# Patient Record
Sex: FEMALE | Race: AMERICAN INDIAN OR ALASKA NATIVE
[De-identification: names, ages, dates, MRNs, and addresses within clinical notes are randomized per-mention and may not be internally consistent; named-entity substitution may affect disease eponyms.]

---

## 2019-09-05 ENCOUNTER — HOSPITAL ENCOUNTER (EMERGENCY)
Dept: HOSPITAL 5 - ED | Age: 34
Discharge: HOME | End: 2019-09-05
Payer: COMMERCIAL

## 2019-09-05 VITALS — DIASTOLIC BLOOD PRESSURE: 94 MMHG | SYSTOLIC BLOOD PRESSURE: 126 MMHG

## 2019-09-05 DIAGNOSIS — N83.201: ICD-10-CM

## 2019-09-05 DIAGNOSIS — K29.70: Primary | ICD-10-CM

## 2019-09-05 DIAGNOSIS — Z79.899: ICD-10-CM

## 2019-09-05 LAB
ALBUMIN SERPL-MCNC: 3.9 G/DL (ref 3.9–5)
ALT SERPL-CCNC: 20 UNITS/L (ref 7–56)
BACTERIA #/AREA URNS HPF: (no result) /HPF
BASOPHILS # (AUTO): 0 K/MM3 (ref 0–0.1)
BASOPHILS NFR BLD AUTO: 0.5 % (ref 0–1.8)
BILIRUB UR QL STRIP: (no result)
BLOOD UR QL VISUAL: (no result)
BUN SERPL-MCNC: 5 MG/DL (ref 7–17)
BUN/CREAT SERPL: 8 %
CALCIUM SERPL-MCNC: 9.1 MG/DL (ref 8.4–10.2)
EOSINOPHIL # BLD AUTO: 0 K/MM3 (ref 0–0.4)
EOSINOPHIL NFR BLD AUTO: 0.5 % (ref 0–4.3)
HCT VFR BLD CALC: 37.5 % (ref 30.3–42.9)
HEMOLYSIS INDEX: 7
HGB BLD-MCNC: 12.7 GM/DL (ref 10.1–14.3)
LYMPHOCYTES # BLD AUTO: 1.7 K/MM3 (ref 1.2–5.4)
LYMPHOCYTES NFR BLD AUTO: 27.6 % (ref 13.4–35)
MCHC RBC AUTO-ENTMCNC: 34 % (ref 30–34)
MCV RBC AUTO: 88 FL (ref 79–97)
MONOCYTES # (AUTO): 0.5 K/MM3 (ref 0–0.8)
MONOCYTES % (AUTO): 8 % (ref 0–7.3)
PH UR STRIP: 7 [PH] (ref 5–7)
PLATELET # BLD: 272 K/MM3 (ref 140–440)
PROT UR STRIP-MCNC: (no result) MG/DL
RBC # BLD AUTO: 4.27 M/MM3 (ref 3.65–5.03)
RBC #/AREA URNS HPF: 2 /HPF (ref 0–6)
UROBILINOGEN UR-MCNC: < 2 MG/DL (ref ?–2)
WBC #/AREA URNS HPF: 3 /HPF (ref 0–6)

## 2019-09-05 PROCEDURE — 74177 CT ABD & PELVIS W/CONTRAST: CPT

## 2019-09-05 PROCEDURE — 85025 COMPLETE CBC W/AUTO DIFF WBC: CPT

## 2019-09-05 PROCEDURE — 96374 THER/PROPH/DIAG INJ IV PUSH: CPT

## 2019-09-05 PROCEDURE — 99284 EMERGENCY DEPT VISIT MOD MDM: CPT

## 2019-09-05 PROCEDURE — 36415 COLL VENOUS BLD VENIPUNCTURE: CPT

## 2019-09-05 PROCEDURE — 84703 CHORIONIC GONADOTROPIN ASSAY: CPT

## 2019-09-05 PROCEDURE — 81001 URINALYSIS AUTO W/SCOPE: CPT

## 2019-09-05 PROCEDURE — 96361 HYDRATE IV INFUSION ADD-ON: CPT

## 2019-09-05 PROCEDURE — 96375 TX/PRO/DX INJ NEW DRUG ADDON: CPT

## 2019-09-05 PROCEDURE — 80053 COMPREHEN METABOLIC PANEL: CPT

## 2019-09-05 NOTE — EMERGENCY DEPARTMENT REPORT
ED Abdominal Pain HPI





- General


Chief Complaint: Abdominal Pain


Stated Complaint: EXTREME ABD PAIN


Time Seen by Provider: 09/05/19 11:05


Source: patient


Mode of arrival: Ambulatory


Limitations: No Limitations





- History of Present Illness


Initial Comments: 





Patient is a 34-year-old  female who is presenting with some epigastric 

and central abdominal crampiness with nausea and vomiting for the past 2 days.  

Patient states she has a similar episode last month.  Patient's been able to 

keep very little down in the last 2 days secondary to the nausea and vomiting.  

She denies diarrhea fevers chills cough cold or congestion at this time.  

Patient's states that she did see her primary care physician last month and was 

told she likely had a gastroenteritis.  Patient went to emergency department as 

well was told she may have an infection but was not given any additional 

details.


Severity scale (0 -10): 2





- Related Data


                                  Previous Rx's











 Medication  Instructions  Recorded  Last Taken  Type


 


Dicyclomine [Bentyl] 20 mg PO QID #10 tablet 09/05/19 Unknown Rx


 


Ondansetron [Zofran Odt] 4 mg PO Q8HR #10 tab.rapdis 09/05/19 Unknown Rx


 


Pantoprazole [Protonix] 40 mg PO QDAY #30 tablet 09/05/19 Unknown Rx


 


traMADol [Ultram] 50 mg PO Q6HR PRN #12 tablet 09/05/19 Unknown Rx











                                    Allergies











Allergy/AdvReac Type Severity Reaction Status Date / Time


 


No Known Allergies Allergy   Unverified 09/05/19 10:07














ED Review of Systems


ROS: 


Stated complaint: EXTREME ABD PAIN


Other details as noted in HPI





Comment: All other systems reviewed and negative





ED Past Medical Hx





- Past Medical History


Previous Medical History?: No





- Social History


Smoking Status: Never Smoker


Substance Use Type: None





- Medications


Home Medications: 


                                Home Medications











 Medication  Instructions  Recorded  Confirmed  Last Taken  Type


 


Dicyclomine [Bentyl] 20 mg PO QID #10 tablet 09/05/19  Unknown Rx


 


Ondansetron [Zofran Odt] 4 mg PO Q8HR #10 tab.rapdis 09/05/19  Unknown Rx


 


Pantoprazole [Protonix] 40 mg PO QDAY #30 tablet 09/05/19  Unknown Rx


 


traMADol [Ultram] 50 mg PO Q6HR PRN #12 tablet 09/05/19  Unknown Rx














ED Physical Exam





- General


Limitations: No Limitations


General appearance: alert, in no apparent distress





- Head


Head exam: Present: atraumatic, normocephalic





- Eye


Eye exam: Present: normal appearance.  Absent: PERRL, EOMI





- ENT


ENT exam: Present: mucous membranes moist





- Neck


Neck exam: Present: normal inspection





- Respiratory


Respiratory exam: Present: normal lung sounds bilaterally.  Absent: respiratory 

distress, wheezes, rales, rhonchi





- Cardiovascular


Cardiovascular Exam: Present: regular rate, normal rhythm.  Absent: systolic 

murmur, diastolic murmur, rubs, gallop





- GI/Abdominal


GI/Abdominal exam: Present: soft, tenderness (central), normal bowel sounds.  

Absent: distended, guarding, rebound





- Extremities Exam


Extremities exam: Present: normal inspection





- Back Exam


Back exam: Present: normal inspection





- Neurological Exam


Neurological exam: Present: alert, oriented X3





- Psychiatric


Psychiatric exam: Present: normal affect, normal mood





- Skin


Skin exam: Present: warm, dry, intact, normal color.  Absent: rash





ED Course





                                   Vital Signs











  09/05/19





  10:07


 


Temperature 98.6 F


 


Pulse Rate 80


 


Respiratory 18





Rate 


 


Blood Pressure 112/86





[Left] 


 


O2 Sat by Pulse 100





Oximetry 














ED Medical Decision Making





- Lab Data


Result diagrams: 


                                 09/05/19 10:20





                                 09/05/19 10:20








                                   Lab Results











  09/05/19 09/05/19 09/05/19 Range/Units





  10:20 10:20 10:30 


 


WBC  6.2    (4.5-11.0)  K/mm3


 


RBC  4.27    (3.65-5.03)  M/mm3


 


Hgb  12.7    (10.1-14.3)  gm/dl


 


Hct  37.5    (30.3-42.9)  %


 


MCV  88    (79-97)  fl


 


MCH  30    (28-32)  pg


 


MCHC  34    (30-34)  %


 


RDW  13.9    (13.2-15.2)  %


 


Plt Count  272    (140-440)  K/mm3


 


Lymph % (Auto)  27.6    (13.4-35.0)  %


 


Mono % (Auto)  8.0 H    (0.0-7.3)  %


 


Eos % (Auto)  0.5    (0.0-4.3)  %


 


Baso % (Auto)  0.5    (0.0-1.8)  %


 


Lymph #  1.7    (1.2-5.4)  K/mm3


 


Mono #  0.5    (0.0-0.8)  K/mm3


 


Eos #  0.0    (0.0-0.4)  K/mm3


 


Baso #  0.0    (0.0-0.1)  K/mm3


 


Seg Neutrophils %  63.4    (40.0-70.0)  %


 


Seg Neutrophils #  4.0    (1.8-7.7)  K/mm3


 


Sodium   136 L   (137-145)  mmol/L


 


Potassium   4.0   (3.6-5.0)  mmol/L


 


Chloride   100.1   ()  mmol/L


 


Carbon Dioxide   24   (22-30)  mmol/L


 


Anion Gap   16   mmol/L


 


BUN   5 L   (7-17)  mg/dL


 


Creatinine   0.6 L   (0.7-1.2)  mg/dL


 


Estimated GFR   > 60   ml/min


 


BUN/Creatinine Ratio   8   %


 


Glucose   94   ()  mg/dL


 


Calcium   9.1   (8.4-10.2)  mg/dL


 


Total Bilirubin   0.40   (0.1-1.2)  mg/dL


 


AST   19   (5-40)  units/L


 


ALT   20   (7-56)  units/L


 


Alkaline Phosphatase   52   ()  units/L


 


Total Protein   7.4   (6.3-8.2)  g/dL


 


Albumin   3.9   (3.9-5)  g/dL


 


Albumin/Globulin Ratio   1.1   %


 


HCG, Qual     (Negative)  


 


Urine Color    Yellow  (Yellow)  


 


Urine Turbidity    Slightly-cloudy  (Clear)  


 


Urine pH    7.0  (5.0-7.0)  


 


Ur Specific Gravity    1.008  (1.003-1.030)  


 


Urine Protein    <15 mg/dl  (Negative)  mg/dL


 


Urine Glucose (UA)    Neg  (Negative)  mg/dL


 


Urine Ketones    Neg  (Negative)  mg/dL


 


Urine Blood    Sm  (Negative)  


 


Urine Nitrite    Neg  (Negative)  


 


Urine Bilirubin    Neg  (Negative)  


 


Urine Urobilinogen    < 2.0  (<2.0)  mg/dL


 


Ur Leukocyte Esterase    Neg  (Negative)  


 


Urine WBC (Auto)    3.0  (0.0-6.0)  /HPF


 


Urine RBC (Auto)    2.0  (0.0-6.0)  /HPF


 


U Epithel Cells (Auto)    9.0  (0-13.0)  /HPF


 


Urine Bacteria (Auto)    1+  (Negative)  /HPF














  09/05/19 Range/Units





  14:10 


 


WBC   (4.5-11.0)  K/mm3


 


RBC   (3.65-5.03)  M/mm3


 


Hgb   (10.1-14.3)  gm/dl


 


Hct   (30.3-42.9)  %


 


MCV   (79-97)  fl


 


MCH   (28-32)  pg


 


MCHC   (30-34)  %


 


RDW   (13.2-15.2)  %


 


Plt Count   (140-440)  K/mm3


 


Lymph % (Auto)   (13.4-35.0)  %


 


Mono % (Auto)   (0.0-7.3)  %


 


Eos % (Auto)   (0.0-4.3)  %


 


Baso % (Auto)   (0.0-1.8)  %


 


Lymph #   (1.2-5.4)  K/mm3


 


Mono #   (0.0-0.8)  K/mm3


 


Eos #   (0.0-0.4)  K/mm3


 


Baso #   (0.0-0.1)  K/mm3


 


Seg Neutrophils %   (40.0-70.0)  %


 


Seg Neutrophils #   (1.8-7.7)  K/mm3


 


Sodium   (137-145)  mmol/L


 


Potassium   (3.6-5.0)  mmol/L


 


Chloride   ()  mmol/L


 


Carbon Dioxide   (22-30)  mmol/L


 


Anion Gap   mmol/L


 


BUN   (7-17)  mg/dL


 


Creatinine   (0.7-1.2)  mg/dL


 


Estimated GFR   ml/min


 


BUN/Creatinine Ratio   %


 


Glucose   ()  mg/dL


 


Calcium   (8.4-10.2)  mg/dL


 


Total Bilirubin   (0.1-1.2)  mg/dL


 


AST   (5-40)  units/L


 


ALT   (7-56)  units/L


 


Alkaline Phosphatase   ()  units/L


 


Total Protein   (6.3-8.2)  g/dL


 


Albumin   (3.9-5)  g/dL


 


Albumin/Globulin Ratio   %


 


HCG, Qual  Negative  (Negative)  


 


Urine Color   (Yellow)  


 


Urine Turbidity   (Clear)  


 


Urine pH   (5.0-7.0)  


 


Ur Specific Gravity   (1.003-1.030)  


 


Urine Protein   (Negative)  mg/dL


 


Urine Glucose (UA)   (Negative)  mg/dL


 


Urine Ketones   (Negative)  mg/dL


 


Urine Blood   (Negative)  


 


Urine Nitrite   (Negative)  


 


Urine Bilirubin   (Negative)  


 


Urine Urobilinogen   (<2.0)  mg/dL


 


Ur Leukocyte Esterase   (Negative)  


 


Urine WBC (Auto)   (0.0-6.0)  /HPF


 


Urine RBC (Auto)   (0.0-6.0)  /HPF


 


U Epithel Cells (Auto)   (0-13.0)  /HPF


 


Urine Bacteria (Auto)   (Negative)  /HPF














- Radiology Data








CT ABDOMEN AND PELVIS WITH CONTRAST





HISTORY: Nausea and vomiting with abdominal pain





COMPARISON: None.





TECHNIQUE: Axial CT images were obtained through the abdomen and pelvis after 

100 cc of Omnipaque


300 intravenously. Sagittal and coronal reformatted images. All CT scans at this

 location are


performed using CT dose reduction for ALARA by means of automated exposure 

control.








FINDINGS:





CT ABDOMEN:


Lung Bases: Clear.


Liver: No significant abnormality.


Biliary: No significant abnormality.


Spleen: No significant abnormality. Unenlarged.


Pancreas: No significant abnormality.


Adrenals: No significant abnormality.


Kidneys: No significant abnormality.


Lymphatics: No lymphadenopathy.


Vasculature: No significant abnormality.


Bowel/Peritoneum: No significant abnormality. No free air. No free fluid. The 

appendix is not


confidently identified.





CT PELVIS:


: A 4.0 x 3.3 cm right ovarian cyst is identified. The left adnexa is 

unremarkable. The uterus is


normal size and contour. A 1 cm intramural fibroid is suspected in the anterior 

wall. An intraut


erine device is identified in the lower uterine segment.





Osseous Structures: No significant abnormality.


Additional Findings: None





IMPRESSION:


No acute inflammatory process is identified.


Right ovarian cyst, simple in appearance.





Signer Name: Yan Bryan Jr, MD


Signed: 9/5/2019 4:19 PM


Workstation Name: [id number]








Transcribed By: TTR


Dictated By: YAN BRYAN JR, MD


Electronically Authenticated By: YAN BRYAN JR, MD


Signed Date/Time: 09/05/19 1619





- Medical Decision Making





A sugar here for his second episode in the month of abdominal cramping with 

nausea and vomiting.  Workup here is essentially within normal limits.  Patient 

and talking with her further states that many years ago she was told she may 

have ulcers.  Patient be referred to GI and started on Protonix and Bentyl.


Critical care attestation.: 


If time is entered above; I have spent that time in minutes in the direct care 

of this critically ill patient, excluding procedure time.








ED Disposition


Clinical Impression: 


Gastritis


Qualifiers:


 Gastritis type: unspecified gastritis Chronicity: acute Gastritis bleeding: 

without bleeding Qualified Code(s): K29.00 - Acute gastritis without bleeding





Disposition: DC-01 TO HOME OR SELFCARE


Is pt being admited?: No


Does the pt Need Aspirin: No


Condition: Stable


Instructions:  Peptic Ulcer (ED), Gastritis (ED)


Referrals: 


[facility] [Provider Group] - 3-5 Days


Time of Disposition: 16:44

## 2019-09-05 NOTE — CAT SCAN REPORT
CT ABDOMEN AND PELVIS WITH CONTRAST



HISTORY: Nausea and vomiting with abdominal pain



COMPARISON: None.



TECHNIQUE: Axial CT images were obtained through the abdomen and pelvis after 100 cc of Omnipaque 300
 intravenously. Sagittal and coronal reformatted images. All CT scans at this location are performed 
using CT dose reduction for ALARA by means of automated exposure control.





FINDINGS:



CT ABDOMEN:

Lung Bases: Clear.

Liver: No significant abnormality.

Biliary: No significant abnormality.

Spleen: No significant abnormality.  Unenlarged.

Pancreas: No significant abnormality.

Adrenals: No significant abnormality.

Kidneys: No significant abnormality.

Lymphatics: No lymphadenopathy.

Vasculature: No significant abnormality. 

Bowel/Peritoneum: No significant abnormality. No free air. No free fluid. The appendix is not confide
ntly identified.



CT PELVIS:

: A 4.0 x 3.3 cm right ovarian cyst is identified. The left adnexa is unremarkable. The uterus is n
ormal size and contour. A 1 cm intramural fibroid is suspected in the anterior wall. An intrauterine 
device is identified in the lower uterine segment.



Osseous Structures: No significant abnormality.

Additional Findings: None



IMPRESSION:

No acute inflammatory process is identified.

Right ovarian cyst, simple in appearance.



Signer Name: Yan Ridley Jr, MD 

Signed: 9/5/2019 4:19 PM

 Workstation Name: [id number]